# Patient Record
Sex: MALE | Race: WHITE | URBAN - METROPOLITAN AREA
[De-identification: names, ages, dates, MRNs, and addresses within clinical notes are randomized per-mention and may not be internally consistent; named-entity substitution may affect disease eponyms.]

---

## 2022-03-10 ENCOUNTER — OFFICE VISIT (OUTPATIENT)
Dept: URBAN - METROPOLITAN AREA CLINIC 48 | Facility: CLINIC | Age: 82
End: 2022-03-10
Payer: COMMERCIAL

## 2022-03-10 DIAGNOSIS — H40.032 ANATOMICAL NARROW ANGLE, LEFT EYE: ICD-10-CM

## 2022-03-10 DIAGNOSIS — H40.1133 PRIMARY OPEN-ANGLE GLAUCOMA, BILATERAL, SEVERE STAGE: Primary | ICD-10-CM

## 2022-03-10 PROCEDURE — 92020 GONIOSCOPY: CPT | Performed by: OPHTHALMOLOGY

## 2022-03-10 PROCEDURE — 99204 OFFICE O/P NEW MOD 45 MIN: CPT | Performed by: OPHTHALMOLOGY

## 2022-03-10 PROCEDURE — 76514 ECHO EXAM OF EYE THICKNESS: CPT | Performed by: OPHTHALMOLOGY

## 2022-03-10 RX ORDER — NETARSUDIL AND LATANOPROST OPHTHALMIC SOLUTION, 0.02%/0.005% .2; .05 MG/ML; MG/ML
SOLUTION/ DROPS OPHTHALMIC; TOPICAL
Qty: 2.5 | Refills: 3 | Status: ACTIVE
Start: 2022-03-10

## 2022-03-10 RX ORDER — BRIMONIDINE TARTRATE, TIMOLOL MALEATE 2; 5 MG/ML; MG/ML
SOLUTION/ DROPS OPHTHALMIC
Qty: 15 | Refills: 4 | Status: ACTIVE
Start: 2022-03-10

## 2022-03-10 RX ORDER — LATANOPROSTENE BUNOD 0.24 MG/ML
0.024 % SOLUTION/ DROPS OPHTHALMIC
Qty: 15 | Refills: 4 | Status: ACTIVE
Start: 2022-03-10

## 2022-03-10 ASSESSMENT — INTRAOCULAR PRESSURE
OS: 17
OD: 15

## 2022-03-10 NOTE — IMPRESSION/PLAN
Impression: Primary open-angle glaucoma, bilateral, severe stage: W35.3450. Plan: IOP within good range OU, however fundus exam shows extensive glaucoma damage OU. Advised patient we need to better control IOP and hopefully reduce to 12 OU. Will add additional IOP lowering therapy OU. Combigan BID OU, Vyzulta QHS OU, Methazolamide 50mg TID PO, Refresh Tears as directed. Start: Rocklatan QHS OU Common side effects include: eye redness, corneal abnormalities, instillation site pain, and burst blood vessels in the eye.

## 2022-05-27 ENCOUNTER — OFFICE VISIT (OUTPATIENT)
Dept: URBAN - METROPOLITAN AREA CLINIC 46 | Facility: CLINIC | Age: 82
End: 2022-05-27
Payer: MEDICARE

## 2022-05-27 DIAGNOSIS — H40.1133 PRIMARY OPEN-ANGLE GLAUCOMA, BILATERAL, SEVERE STAGE: Primary | ICD-10-CM

## 2022-05-27 PROCEDURE — 99213 OFFICE O/P EST LOW 20 MIN: CPT | Performed by: OPHTHALMOLOGY

## 2022-05-27 RX ORDER — BRIMONIDINE TARTRATE, TIMOLOL MALEATE 2; 5 MG/ML; MG/ML
SOLUTION/ DROPS OPHTHALMIC
Qty: 15 | Refills: 4 | Status: ACTIVE
Start: 2022-05-27

## 2022-05-27 RX ORDER — NETARSUDIL AND LATANOPROST OPHTHALMIC SOLUTION, 0.02%/0.005% .2; .05 MG/ML; MG/ML
SOLUTION/ DROPS OPHTHALMIC; TOPICAL
Qty: 7.5 | Refills: 4 | Status: ACTIVE
Start: 2022-05-27

## 2022-05-27 ASSESSMENT — INTRAOCULAR PRESSURE
OD: 13
OS: 15

## 2022-05-27 NOTE — IMPRESSION/PLAN
Impression: Primary open-angle glaucoma, bilateral, severe stage: C20.1885. Plan: IOP improved OU since last visit, continues slightly higher in OS. Discussed next step would be Trabeculectomy for better IOP control. Patient not keen to have surgery done due to failed Trab in OD. Patient to think about it for now. Will monitor closely. Also discussed CE/IOL for IOP lowering potential.
Continue: Combigan BID OU, Vyzulta QHS OU, Rocklatan QHS OU Continue: Methazolamide 50mg tab TID PO

## 2022-09-16 ENCOUNTER — OFFICE VISIT (OUTPATIENT)
Dept: URBAN - METROPOLITAN AREA CLINIC 46 | Facility: CLINIC | Age: 82
End: 2022-09-16
Payer: MEDICARE

## 2022-09-16 DIAGNOSIS — H01.009 BLEPHARITIS OF EYELID: ICD-10-CM

## 2022-09-16 DIAGNOSIS — H40.1133 PRIMARY OPEN-ANGLE GLAUCOMA, BILATERAL, SEVERE STAGE: Primary | ICD-10-CM

## 2022-09-16 PROCEDURE — 99213 OFFICE O/P EST LOW 20 MIN: CPT | Performed by: OPHTHALMOLOGY

## 2022-09-16 ASSESSMENT — INTRAOCULAR PRESSURE
OD: 15
OS: 13

## 2022-09-16 NOTE — IMPRESSION/PLAN
Impression: Primary open-angle glaucoma, bilateral, severe stage: R53.9281. Plan: IOP above target OD, but within acceptable range, OS within target range. Advised patient tearing is normal due to therapy and surface of eye being weaker than normal. Recommend using lubricating eye drops 3-4x daily OU Continue: Combigan BID OU, Vyzulta QHS OU, Rocklatan QHS OU Continue: Methazolamide 50mg tab TID PO

## 2023-08-04 ENCOUNTER — OFFICE VISIT (OUTPATIENT)
Dept: URBAN - METROPOLITAN AREA CLINIC 46 | Facility: CLINIC | Age: 83
End: 2023-08-04
Payer: MEDICARE

## 2023-08-04 DIAGNOSIS — H40.1133 PRIMARY OPEN-ANGLE GLAUCOMA, BILATERAL, SEVERE STAGE: Primary | ICD-10-CM

## 2023-08-04 PROCEDURE — 99213 OFFICE O/P EST LOW 20 MIN: CPT | Performed by: OPHTHALMOLOGY

## 2023-08-04 RX ORDER — LATANOPROSTENE BUNOD 0.24 MG/ML
0.024 % SOLUTION/ DROPS OPHTHALMIC
Qty: 7.5 | Refills: 4 | Status: ACTIVE
Start: 2023-08-04

## 2023-08-04 RX ORDER — METHAZOLAMIDE 50 MG/1
50 MG TABLET ORAL
Qty: 90 | Refills: 5 | Status: ACTIVE
Start: 2023-08-04

## 2023-08-04 ASSESSMENT — INTRAOCULAR PRESSURE
OD: 15
OS: 15

## 2024-02-01 ENCOUNTER — OFFICE VISIT (OUTPATIENT)
Dept: URBAN - METROPOLITAN AREA CLINIC 46 | Facility: CLINIC | Age: 84
End: 2024-02-01
Payer: MEDICARE

## 2024-02-01 DIAGNOSIS — H40.1133 PRIMARY OPEN-ANGLE GLAUCOMA, BILATERAL, SEVERE STAGE: Primary | ICD-10-CM

## 2024-02-01 PROCEDURE — 99214 OFFICE O/P EST MOD 30 MIN: CPT | Performed by: OPHTHALMOLOGY

## 2024-02-01 PROCEDURE — 92133 CPTRZD OPH DX IMG PST SGM ON: CPT | Performed by: OPHTHALMOLOGY

## 2024-02-01 PROCEDURE — 92083 EXTENDED VISUAL FIELD XM: CPT | Performed by: OPHTHALMOLOGY

## 2024-02-01 RX ORDER — NETARSUDIL AND LATANOPROST OPHTHALMIC SOLUTION, 0.02%/0.005% .2; .05 MG/ML; MG/ML
SOLUTION/ DROPS OPHTHALMIC; TOPICAL
Qty: 7.5 | Refills: 3 | Status: ACTIVE
Start: 2024-02-01

## 2024-02-01 RX ORDER — KETOROLAC TROMETHAMINE 5 MG/ML
0.5 % SOLUTION OPHTHALMIC
Qty: 5 | Refills: 0 | Status: ACTIVE
Start: 2024-02-01

## 2024-02-01 RX ORDER — BRIMONIDINE TARTRATE, TIMOLOL MALEATE 2; 5 MG/ML; MG/ML
SOLUTION/ DROPS OPHTHALMIC
Qty: 15 | Refills: 4 | Status: ACTIVE
Start: 2024-02-01

## 2024-02-01 ASSESSMENT — INTRAOCULAR PRESSURE
OS: 17
OD: 19

## 2024-05-23 ENCOUNTER — OFFICE VISIT (OUTPATIENT)
Dept: URBAN - METROPOLITAN AREA CLINIC 46 | Facility: CLINIC | Age: 84
End: 2024-05-23
Payer: MEDICARE

## 2024-05-23 DIAGNOSIS — H40.1133 PRIMARY OPEN-ANGLE GLAUCOMA, BILATERAL, SEVERE STAGE: Primary | ICD-10-CM

## 2024-05-23 PROCEDURE — 99213 OFFICE O/P EST LOW 20 MIN: CPT | Performed by: OPHTHALMOLOGY

## 2024-05-23 RX ORDER — METHAZOLAMIDE 50 MG/1
50 MG TABLET ORAL
Qty: 90 | Refills: 5 | Status: ACTIVE
Start: 2024-05-23

## 2024-05-23 RX ORDER — LATANOPROSTENE BUNOD 0.24 MG/ML
0.024 % SOLUTION/ DROPS OPHTHALMIC
Qty: 7.5 | Refills: 3 | Status: ACTIVE
Start: 2024-05-23

## 2024-05-23 RX ORDER — BRIMONIDINE TARTRATE, TIMOLOL MALEATE 2; 5 MG/ML; MG/ML
SOLUTION/ DROPS OPHTHALMIC
Qty: 15 | Refills: 4 | Status: ACTIVE
Start: 2024-05-23

## 2024-05-23 RX ORDER — NETARSUDIL AND LATANOPROST OPHTHALMIC SOLUTION, 0.02%/0.005% .2; .05 MG/ML; MG/ML
SOLUTION/ DROPS OPHTHALMIC; TOPICAL
Qty: 7.5 | Refills: 3 | Status: ACTIVE
Start: 2024-05-23

## 2024-05-23 ASSESSMENT — INTRAOCULAR PRESSURE
OS: 12
OD: 15

## 2024-09-26 ENCOUNTER — OFFICE VISIT (OUTPATIENT)
Dept: URBAN - METROPOLITAN AREA CLINIC 46 | Facility: CLINIC | Age: 84
End: 2024-09-26
Payer: MEDICARE

## 2024-09-26 DIAGNOSIS — H40.1133 PRIMARY OPEN-ANGLE GLAUCOMA, BILATERAL, SEVERE STAGE: Primary | ICD-10-CM

## 2024-09-26 PROCEDURE — 99213 OFFICE O/P EST LOW 20 MIN: CPT | Performed by: OPHTHALMOLOGY

## 2024-09-26 ASSESSMENT — INTRAOCULAR PRESSURE
OD: 19
OS: 17

## 2025-01-30 ENCOUNTER — OFFICE VISIT (OUTPATIENT)
Dept: URBAN - METROPOLITAN AREA CLINIC 46 | Facility: CLINIC | Age: 85
End: 2025-01-30
Payer: MEDICARE

## 2025-01-30 DIAGNOSIS — H40.1133 PRIMARY OPEN-ANGLE GLAUCOMA, BILATERAL, SEVERE STAGE: Primary | ICD-10-CM

## 2025-01-30 DIAGNOSIS — H25.13 AGE-RELATED NUCLEAR CATARACT, BILATERAL: ICD-10-CM

## 2025-01-30 PROCEDURE — 99214 OFFICE O/P EST MOD 30 MIN: CPT | Performed by: OPHTHALMOLOGY

## 2025-01-30 PROCEDURE — 92133 CPTRZD OPH DX IMG PST SGM ON: CPT | Performed by: OPHTHALMOLOGY

## 2025-01-30 RX ORDER — NETARSUDIL AND LATANOPROST OPHTHALMIC SOLUTION, 0.02%/0.005% .2; .05 MG/ML; MG/ML
SOLUTION/ DROPS OPHTHALMIC; TOPICAL
Qty: 7.5 | Refills: 3 | Status: ACTIVE
Start: 2025-01-30

## 2025-01-30 RX ORDER — LATANOPROSTENE BUNOD 0.24 MG/ML
0.024 % SOLUTION/ DROPS OPHTHALMIC
Qty: 7.5 | Refills: 3 | Status: ACTIVE
Start: 2025-01-30

## 2025-01-30 RX ORDER — NETARSUDIL AND LATANOPROST OPHTHALMIC SOLUTION, 0.02%/0.005% .2; .05 MG/ML; MG/ML
SOLUTION/ DROPS OPHTHALMIC; TOPICAL
Qty: 7.5 | Refills: 3 | Status: INACTIVE
Start: 2025-01-30 | End: 2025-01-30

## 2025-01-30 RX ORDER — BRIMONIDINE TARTRATE, TIMOLOL MALEATE 2; 5 MG/ML; MG/ML
SOLUTION/ DROPS OPHTHALMIC
Qty: 15 | Refills: 4 | Status: ACTIVE
Start: 2025-01-30

## 2025-01-30 ASSESSMENT — INTRAOCULAR PRESSURE
OD: 19
OS: 17

## 2025-05-08 ENCOUNTER — OFFICE VISIT (OUTPATIENT)
Dept: URBAN - METROPOLITAN AREA CLINIC 46 | Facility: CLINIC | Age: 85
End: 2025-05-08
Payer: MEDICARE

## 2025-05-08 DIAGNOSIS — H40.1133 PRIMARY OPEN-ANGLE GLAUCOMA, BILATERAL, SEVERE STAGE: Primary | ICD-10-CM

## 2025-05-08 PROCEDURE — 99213 OFFICE O/P EST LOW 20 MIN: CPT | Performed by: OPHTHALMOLOGY

## 2025-05-08 RX ORDER — NETARSUDIL AND LATANOPROST OPHTHALMIC SOLUTION, 0.02%/0.005% .2; .05 MG/ML; MG/ML
SOLUTION/ DROPS OPHTHALMIC; TOPICAL
Qty: 7.5 | Refills: 3 | Status: ACTIVE
Start: 2025-05-08

## 2025-05-08 RX ORDER — BRIMONIDINE TARTRATE, TIMOLOL MALEATE 2; 5 MG/ML; MG/ML
SOLUTION/ DROPS OPHTHALMIC
Qty: 15 | Refills: 4 | Status: ACTIVE
Start: 2025-05-08

## 2025-05-08 ASSESSMENT — INTRAOCULAR PRESSURE
OS: 17
OD: 19